# Patient Record
Sex: FEMALE | Race: OTHER | HISPANIC OR LATINO | URBAN - METROPOLITAN AREA
[De-identification: names, ages, dates, MRNs, and addresses within clinical notes are randomized per-mention and may not be internally consistent; named-entity substitution may affect disease eponyms.]

---

## 2024-05-12 ENCOUNTER — EMERGENCY (EMERGENCY)
Facility: HOSPITAL | Age: 19
LOS: 1 days | Discharge: HOME | End: 2024-05-12
Attending: STUDENT IN AN ORGANIZED HEALTH CARE EDUCATION/TRAINING PROGRAM
Payer: MEDICAID

## 2024-05-12 VITALS
HEART RATE: 114 BPM | OXYGEN SATURATION: 99 % | RESPIRATION RATE: 18 BRPM | TEMPERATURE: 100 F | HEIGHT: 62 IN | WEIGHT: 130.07 LBS | SYSTOLIC BLOOD PRESSURE: 97 MMHG | DIASTOLIC BLOOD PRESSURE: 65 MMHG

## 2024-05-12 LAB
ALBUMIN SERPL ELPH-MCNC: 3.7 G/DL — SIGNIFICANT CHANGE UP (ref 3.5–5)
ALP SERPL-CCNC: 70 U/L — SIGNIFICANT CHANGE UP (ref 40–120)
ALT FLD-CCNC: 21 U/L DA — SIGNIFICANT CHANGE UP (ref 10–60)
ANION GAP SERPL CALC-SCNC: 6 MMOL/L — SIGNIFICANT CHANGE UP (ref 5–17)
APTT BLD: 31.1 SEC — SIGNIFICANT CHANGE UP (ref 24.5–35.6)
AST SERPL-CCNC: 16 U/L — SIGNIFICANT CHANGE UP (ref 10–40)
BASOPHILS # BLD AUTO: 0.03 K/UL — SIGNIFICANT CHANGE UP (ref 0–0.2)
BASOPHILS NFR BLD AUTO: 0.2 % — SIGNIFICANT CHANGE UP (ref 0–2)
BILIRUB SERPL-MCNC: 0.5 MG/DL — SIGNIFICANT CHANGE UP (ref 0.2–1.2)
BUN SERPL-MCNC: 9 MG/DL — SIGNIFICANT CHANGE UP (ref 7–18)
CALCIUM SERPL-MCNC: 9.1 MG/DL — SIGNIFICANT CHANGE UP (ref 8.4–10.5)
CHLORIDE SERPL-SCNC: 105 MMOL/L — SIGNIFICANT CHANGE UP (ref 96–108)
CO2 SERPL-SCNC: 23 MMOL/L — SIGNIFICANT CHANGE UP (ref 22–31)
CREAT SERPL-MCNC: 0.78 MG/DL — SIGNIFICANT CHANGE UP (ref 0.5–1.3)
EGFR: 111 ML/MIN/1.73M2 — SIGNIFICANT CHANGE UP
EOSINOPHIL # BLD AUTO: 0 K/UL — SIGNIFICANT CHANGE UP (ref 0–0.5)
EOSINOPHIL NFR BLD AUTO: 0 % — SIGNIFICANT CHANGE UP (ref 0–6)
FLUAV AG NPH QL: SIGNIFICANT CHANGE UP
FLUBV AG NPH QL: SIGNIFICANT CHANGE UP
GLUCOSE SERPL-MCNC: 104 MG/DL — HIGH (ref 70–99)
HCG SERPL-ACNC: <1 MIU/ML — SIGNIFICANT CHANGE UP
HCT VFR BLD CALC: 38.8 % — SIGNIFICANT CHANGE UP (ref 34.5–45)
HGB BLD-MCNC: 13 G/DL — SIGNIFICANT CHANGE UP (ref 11.5–15.5)
IMM GRANULOCYTES NFR BLD AUTO: 0.4 % — SIGNIFICANT CHANGE UP (ref 0–0.9)
INR BLD: 1.31 RATIO — HIGH (ref 0.85–1.18)
LYMPHOCYTES # BLD AUTO: 0.7 K/UL — LOW (ref 1–3.3)
LYMPHOCYTES # BLD AUTO: 3.6 % — LOW (ref 13–44)
MCHC RBC-ENTMCNC: 30.6 PG — SIGNIFICANT CHANGE UP (ref 27–34)
MCHC RBC-ENTMCNC: 33.5 GM/DL — SIGNIFICANT CHANGE UP (ref 32–36)
MCV RBC AUTO: 91.3 FL — SIGNIFICANT CHANGE UP (ref 80–100)
MONOCYTES # BLD AUTO: 1.2 K/UL — HIGH (ref 0–0.9)
MONOCYTES NFR BLD AUTO: 6.1 % — SIGNIFICANT CHANGE UP (ref 2–14)
NEUTROPHILS # BLD AUTO: 17.56 K/UL — HIGH (ref 1.8–7.4)
NEUTROPHILS NFR BLD AUTO: 89.7 % — HIGH (ref 43–77)
NRBC # BLD: 0 /100 WBCS — SIGNIFICANT CHANGE UP (ref 0–0)
PLATELET # BLD AUTO: 203 K/UL — SIGNIFICANT CHANGE UP (ref 150–400)
POTASSIUM SERPL-MCNC: 3.4 MMOL/L — LOW (ref 3.5–5.3)
POTASSIUM SERPL-SCNC: 3.4 MMOL/L — LOW (ref 3.5–5.3)
PROT SERPL-MCNC: 7.3 G/DL — SIGNIFICANT CHANGE UP (ref 6–8.3)
PROTHROM AB SERPL-ACNC: 14.8 SEC — HIGH (ref 9.5–13)
RBC # BLD: 4.25 M/UL — SIGNIFICANT CHANGE UP (ref 3.8–5.2)
RBC # FLD: 13.3 % — SIGNIFICANT CHANGE UP (ref 10.3–14.5)
SARS-COV-2 RNA SPEC QL NAA+PROBE: SIGNIFICANT CHANGE UP
SODIUM SERPL-SCNC: 134 MMOL/L — LOW (ref 135–145)
WBC # BLD: 19.57 K/UL — HIGH (ref 3.8–10.5)
WBC # FLD AUTO: 19.57 K/UL — HIGH (ref 3.8–10.5)

## 2024-05-12 PROCEDURE — 80053 COMPREHEN METABOLIC PANEL: CPT

## 2024-05-12 PROCEDURE — 85610 PROTHROMBIN TIME: CPT

## 2024-05-12 PROCEDURE — 99285 EMERGENCY DEPT VISIT HI MDM: CPT | Mod: 25

## 2024-05-12 PROCEDURE — 96374 THER/PROPH/DIAG INJ IV PUSH: CPT

## 2024-05-12 PROCEDURE — 96375 TX/PRO/DX INJ NEW DRUG ADDON: CPT

## 2024-05-12 PROCEDURE — 85025 COMPLETE CBC W/AUTO DIFF WBC: CPT

## 2024-05-12 PROCEDURE — 71046 X-RAY EXAM CHEST 2 VIEWS: CPT

## 2024-05-12 PROCEDURE — 84702 CHORIONIC GONADOTROPIN TEST: CPT

## 2024-05-12 PROCEDURE — 71046 X-RAY EXAM CHEST 2 VIEWS: CPT | Mod: 26

## 2024-05-12 PROCEDURE — 81001 URINALYSIS AUTO W/SCOPE: CPT

## 2024-05-12 PROCEDURE — 87040 BLOOD CULTURE FOR BACTERIA: CPT

## 2024-05-12 PROCEDURE — 87637 SARSCOV2&INF A&B&RSV AMP PRB: CPT

## 2024-05-12 PROCEDURE — 93005 ELECTROCARDIOGRAM TRACING: CPT

## 2024-05-12 PROCEDURE — 36415 COLL VENOUS BLD VENIPUNCTURE: CPT

## 2024-05-12 PROCEDURE — 0225U NFCT DS DNA&RNA 21 SARSCOV2: CPT

## 2024-05-12 PROCEDURE — 99285 EMERGENCY DEPT VISIT HI MDM: CPT

## 2024-05-12 PROCEDURE — 85730 THROMBOPLASTIN TIME PARTIAL: CPT

## 2024-05-12 RX ORDER — SODIUM CHLORIDE 9 MG/ML
2000 INJECTION INTRAMUSCULAR; INTRAVENOUS; SUBCUTANEOUS ONCE
Refills: 0 | Status: COMPLETED | OUTPATIENT
Start: 2024-05-12 | End: 2024-05-12

## 2024-05-12 RX ORDER — ACETAMINOPHEN 500 MG
1000 TABLET ORAL ONCE
Refills: 0 | Status: COMPLETED | OUTPATIENT
Start: 2024-05-12 | End: 2024-05-12

## 2024-05-12 RX ORDER — KETOROLAC TROMETHAMINE 30 MG/ML
15 SYRINGE (ML) INJECTION ONCE
Refills: 0 | Status: DISCONTINUED | OUTPATIENT
Start: 2024-05-12 | End: 2024-05-12

## 2024-05-12 RX ADMIN — SODIUM CHLORIDE 2000 MILLILITER(S): 9 INJECTION INTRAMUSCULAR; INTRAVENOUS; SUBCUTANEOUS at 23:19

## 2024-05-12 RX ADMIN — Medication 400 MILLIGRAM(S): at 23:18

## 2024-05-12 RX ADMIN — Medication 15 MILLIGRAM(S): at 23:18

## 2024-05-12 NOTE — ED PROVIDER NOTE - CLINICAL SUMMARY MEDICAL DECISION MAKING FREE TEXT BOX
patient presenting with fever body ache clinically likely viral no nuchal rigidity no abdominal pain or pulmonary symptoms will obtain lab fluid hydrate ED observation symptomatic treatment and reassess

## 2024-05-12 NOTE — ED PROVIDER NOTE - OBJECTIVE STATEMENT
19-year-old presenting for fever body aches sore throat and generalized back pain for 2 days denies nausea vomiting cough dysuria diarrhea abdominal pain travel sick contact

## 2024-05-12 NOTE — ED PROVIDER NOTE - PATIENT PORTAL LINK FT
You can access the FollowMyHealth Patient Portal offered by [facility] by registering at the following website: [email or web address]. By joining [facility] FollowMyHealth portal, you will also be able to view your health information using other applications (apps) compatible with our system.

## 2024-05-12 NOTE — ED PROVIDER NOTE - NSFOLLOWUPINSTRUCTIONS_ED_ALL_ED_FT
Enfermedades virales en los adultos  Viral Illness, Adult  Los virus son microbios diminutos que entran en el organismo de mica persona y causan enfermedades. Hay muchos tipos diferentes de virus. Y causan muchos tipos de enfermedades. Las enfermedades virales pueden ser leves o graves. Pueden afectar diferentes partes del cuerpo.    Entre las afecciones a corto plazo causadas por virus, se incluyen los resfríos, la gripe y los virus estomacales. Entre las afecciones a rizwan plazo causadas por un virus, se incluyen el herpes, la culebrilla y la infección por el virus de inmunodeficiencia humana (VIH). Se logan identificado unos pocos virus asociados con determinados tipos de cáncer.    ¿Cuáles son las causas?  Muchos tipos de virus pueden causar enfermedades. Los virus ingresan en las células del organismo, se multiplican y provocan que las células infectadas funcionen de manera diferente o mueran. Cuando estas células mueren, liberan más virus. Cuando esto ocurre, aparecen los síntomas de la enfermedad, y el virus se disemina a otras células. Si el virus domina el funcionamiento de la célula, puede hacer que esta se divida y prolifere de manera descontrolada. [place] ocurre cuando un virus causa cáncer.    Los diferentes virus ingresan al organismo de distintas formas. Puede contraer un virus de la siguiente manera:  Al ingerir alimentos o beber agua que logan entrado en contacto con el virus.  Al inhalar gotitas que mica persona infectada liberó en el aire al toser o estornudar.  Al tocar mica superficie que tenga el virus y luego llevarse la mano a la boca, la nariz o los ojos.  Al ser kan por un insecto o mordido por un animal que son portadores del virus.  Al tener contacto sexual con mica persona infectada por el virus.  Al tener contacto con faiza o líquidos que contienen el virus, ya sea a través de un rohini abierto o leia mica transfusión.  Si el virus ingresa al organismo, el sistema del cuerpo que combate las enfermedades (sistema inmunitario) intentará combatirlo. Puede correr un riesgo más alto de tener mica enfermedad viral si tiene el sistema inmunitario debilitado.    ¿Cuáles son los signos o síntomas?  Los síntomas dependen del tipo de virus y de la ubicación de las células en las que ingresa. Entre los síntomas se pueden incluir los siguientes:  Con los virus del resfrío y de la gripe:  Fiebre.  Dolor de nba.  Dolor de garganta.  Emily musculares.  Nariz tapada (congestión nasal).  Tos.  Con los virus estomacales (gastrointestinales):  Fiebre.  Dolor en el abdomen.  Náuseas o vómitos.  Diarrea.  Con los virus hepáticos (hepatitis):  Pérdida del apetito.  Cansancio.  La piel o las partes blanca de los ojos se ponen hilario (ictericia).  Con los virus del cerebro y la médula alvarenga:  Fiebre.  Dolor de nba.  Rigidez en el warren.  Náuseas y vómitos.  Confusión o somnolencia.  Con los virus de la piel:  Verrugas.  Picazón.  Erupción cutánea.  Con los virus de transmisión sexual:  Secreción.  Hinchazón.  Enrojecimiento.  Erupción cutánea.  ¿Cómo se diagnostica?  Esta afección se puede diagnosticar en función de mica o más de las siguientes evaluaciones:  Los síntomas y antecedentes médicos.  Un examen físico.  Pruebas, ruth, por ejemplo:  Análisis de faiza.  Análisis de mica muestra de mucosidad de los pulmones (muestra de esputo).  Análisis de mica muestra de materia fecal (heces).  Análisis de un hisopado de líquidos corporales o mica llaga en la piel (lesión).  ¿Cómo se trata?  Los virus pueden ser difíciles de tratar porque se hospedan en las células. Los antibióticos no tratan los virus porque estos medicamentos no llegan al interior de las células. El tratamiento de mica enfermedad viral puede incluir lo siguiente:  Descansar y beber mucho líquido.  Medicamentos para tratar los síntomas. Estos pueden incluir medicamentos de venta umair para el dolor y la fiebre, medicamentos para la tos o la congestión y medicamentos para la diarrea.  Medicamentos antivirales. Estos medicamentos están disponibles únicamente para ciertos tipos de virus.  Algunas enfermedades virales pueden evitarse con vacunas. Un ejemplo frecuente es la vacuna antigripal.    Siga estas indicaciones en lakhani casa:  Medicamentos    Use los medicamentos de venta umair y los recetados solamente ruth se lo haya indicado el médico.  Si le recetaron un medicamento antiviral, tómelo ruth se lo haya indicado el médico. No deje de kesha el antiviral aunque comience a sentirse mejor.  Sepa cuándo los antibióticos son necesarios y cuándo no. Los antibióticos no combaten a los virus. Si tiene mica infección viral y el médico melissa que también tiene mica infección bacteriana, o que está en riesgo de contraerla, nadeem vez le recete un antibiótico.  No solicite mica receta para antibióticos si le logan diagnosticado mica enfermedad viral. Los antibióticos no harán que se cure más rápidamente.  Kesha antibióticos cuando no son necesarios puede derivar en resistencia a los antibióticos. Cuando esto ocurre, el medicamento pierde lakhani eficacia contra las bacterias que normalmente combate.  Indicaciones generales    Saray suficiente líquido para mantener el pis (la orina) de color amarillo pálido.  Descanse todo lo que pueda.  Retome vicky actividades normales ruth se lo haya indicado el médico. Pregúntele al médico qué actividades son seguras para usted.  ¿Cómo se previene?  A person washing hands with soap and water.  Para reducir el riesgo de contraer otra enfermedad viral:  Lávese las mackenzie frecuentemente con agua y jabón leia al menos 20 segundos. Use desinfectante para mackenzie si no dispone de agua y jabón.  Evite tocarse la nariz, los ojos y la boca, sobre todo si no se lavó las mackenzie recientemente.  Si un miembro de la octavio tiene mica infección viral, limpie todas las superficies de la casa que puedan joyce estado en contacto con el virus. Use "[employer] y jabón. También puede usar mica solución de preparación comercial que contenga lejía.  Manténgase lejos de las personas enfermas con síntomas de mica infección viral.  No comparta objetos tales ruth cepillos de dientes y botellas de agua con otras personas.  Mantenga las vacunas al día. [place] incluye recibir la vacuna antigripal todos los años.  Siga mica dieta saludable y descanse lo suficiente.  Comuníquese con un médico si:  Tiene síntomas de mica enfermedad viral que no desaparecen.  Los síntomas regresan después de joyce desaparecido.  Vicky síntomas empeoran.  Solicite ayuda de inmediato si:  Tiene dificultad para respirar.  Siente un dolor de nba intenso o rigidez en el warren.  Tiene vómitos o dolor abdominal intensos.  Estos síntomas pueden indicar mica emergencia. Solicite ayuda de inmediato. Llame al 911.  No espere a lexii si los síntomas desaparecen.  No conduzca por vicky propios medios hasta el hospital.  Esta información no tiene ruth fin reemplazar el consejo del médico. Asegúrese de hacerle al médico cualquier pregunta que tenga.

## 2024-05-12 NOTE — ED PROVIDER NOTE - PROGRESS NOTE DETAILS
Patient clinically well no signs of distress fever resolved heart rate improved blood pressure systolic 90s Y small stature clinically does not appear to be hypotensive while superbly perfused no nuchal rigidity is just meningitis no abdominal pain to suggest surgical abdomen lungs clear well-appearing labs within normal and cephalo to white count but white count elevated likely in the setting of viral illness no airway compromise no neuro vascular compromise patient clinically stable endorses feeling improved culture sent patient states she feels well and wished to go home given return precaution and for symptomatic treatment return precaution instructed hemodynamically stable neurovascular intact on discharge

## 2024-05-12 NOTE — ED ADULT NURSE NOTE - OBJECTIVE STATEMENT
pt reports that she has  fever for 2 days and generalized body pain, denies dizziness, and all other symptoms

## 2024-05-13 VITALS
RESPIRATION RATE: 18 BRPM | SYSTOLIC BLOOD PRESSURE: 97 MMHG | TEMPERATURE: 99 F | OXYGEN SATURATION: 100 % | HEART RATE: 103 BPM | DIASTOLIC BLOOD PRESSURE: 58 MMHG

## 2024-05-13 LAB
APPEARANCE UR: CLEAR — SIGNIFICANT CHANGE UP
BILIRUB UR-MCNC: NEGATIVE — SIGNIFICANT CHANGE UP
COLOR SPEC: YELLOW — SIGNIFICANT CHANGE UP
DIFF PNL FLD: NEGATIVE — SIGNIFICANT CHANGE UP
GLUCOSE UR QL: NEGATIVE MG/DL — SIGNIFICANT CHANGE UP
KETONES UR-MCNC: 80 MG/DL
LEUKOCYTE ESTERASE UR-ACNC: NEGATIVE — SIGNIFICANT CHANGE UP
NITRITE UR-MCNC: NEGATIVE — SIGNIFICANT CHANGE UP
PH UR: 7.5 — SIGNIFICANT CHANGE UP (ref 5–8)
PROT UR-MCNC: ABNORMAL MG/DL
SP GR SPEC: 1.02 — SIGNIFICANT CHANGE UP (ref 1–1.03)
UROBILINOGEN FLD QL: 2 MG/DL (ref 0.2–1)

## 2024-05-13 RX ORDER — SODIUM CHLORIDE 9 MG/ML
1000 INJECTION INTRAMUSCULAR; INTRAVENOUS; SUBCUTANEOUS ONCE
Refills: 0 | Status: DISCONTINUED | OUTPATIENT
Start: 2024-05-13 | End: 2024-05-16

## 2024-05-13 RX ORDER — IBUPROFEN 200 MG
1 TABLET ORAL
Qty: 20 | Refills: 0
Start: 2024-05-13

## 2024-05-13 RX ORDER — ACETAMINOPHEN 500 MG
2 TABLET ORAL
Qty: 40 | Refills: 0
Start: 2024-05-13

## 2024-05-18 LAB
CULTURE RESULTS: SIGNIFICANT CHANGE UP
CULTURE RESULTS: SIGNIFICANT CHANGE UP
SPECIMEN SOURCE: SIGNIFICANT CHANGE UP
SPECIMEN SOURCE: SIGNIFICANT CHANGE UP

## 2024-07-10 ENCOUNTER — EMERGENCY (EMERGENCY)
Facility: HOSPITAL | Age: 19
LOS: 1 days | Discharge: HOME | End: 2024-07-10
Attending: STUDENT IN AN ORGANIZED HEALTH CARE EDUCATION/TRAINING PROGRAM
Payer: MEDICAID

## 2024-07-10 VITALS
OXYGEN SATURATION: 100 % | SYSTOLIC BLOOD PRESSURE: 101 MMHG | HEIGHT: 62 IN | WEIGHT: 132.28 LBS | DIASTOLIC BLOOD PRESSURE: 68 MMHG | HEART RATE: 70 BPM | RESPIRATION RATE: 20 BRPM | TEMPERATURE: 98 F

## 2024-07-10 PROCEDURE — 99284 EMERGENCY DEPT VISIT MOD MDM: CPT

## 2024-07-10 RX ORDER — ACETAMINOPHEN 500 MG/5ML
975 LIQUID (ML) ORAL ONCE
Refills: 0 | Status: COMPLETED | OUTPATIENT
Start: 2024-07-10 | End: 2024-07-10

## 2024-07-10 RX ADMIN — Medication 975 MILLIGRAM(S): at 23:49

## 2024-07-11 VITALS
DIASTOLIC BLOOD PRESSURE: 64 MMHG | TEMPERATURE: 98 F | RESPIRATION RATE: 18 BRPM | OXYGEN SATURATION: 100 % | SYSTOLIC BLOOD PRESSURE: 98 MMHG | HEART RATE: 72 BPM

## 2024-07-11 LAB
ALBUMIN SERPL ELPH-MCNC: 3.5 G/DL — SIGNIFICANT CHANGE UP (ref 3.5–5)
ALP SERPL-CCNC: 67 U/L — SIGNIFICANT CHANGE UP (ref 40–120)
ALT FLD-CCNC: 33 U/L DA — SIGNIFICANT CHANGE UP (ref 10–60)
ANION GAP SERPL CALC-SCNC: 8 MMOL/L — SIGNIFICANT CHANGE UP (ref 5–17)
APPEARANCE UR: CLEAR — SIGNIFICANT CHANGE UP
AST SERPL-CCNC: 24 U/L — SIGNIFICANT CHANGE UP (ref 10–40)
BASOPHILS # BLD AUTO: 0.04 K/UL — SIGNIFICANT CHANGE UP (ref 0–0.2)
BASOPHILS NFR BLD AUTO: 0.2 % — SIGNIFICANT CHANGE UP (ref 0–2)
BILIRUB SERPL-MCNC: 0.3 MG/DL — SIGNIFICANT CHANGE UP (ref 0.2–1.2)
BILIRUB UR-MCNC: NEGATIVE — SIGNIFICANT CHANGE UP
BLD GP AB SCN SERPL QL: SIGNIFICANT CHANGE UP
BUN SERPL-MCNC: 8 MG/DL — SIGNIFICANT CHANGE UP (ref 7–18)
CALCIUM SERPL-MCNC: 8.6 MG/DL — SIGNIFICANT CHANGE UP (ref 8.4–10.5)
CHLORIDE SERPL-SCNC: 107 MMOL/L — SIGNIFICANT CHANGE UP (ref 96–108)
CO2 SERPL-SCNC: 21 MMOL/L — LOW (ref 22–31)
COLOR SPEC: YELLOW — SIGNIFICANT CHANGE UP
CREAT SERPL-MCNC: 0.61 MG/DL — SIGNIFICANT CHANGE UP (ref 0.5–1.3)
DIFF PNL FLD: NEGATIVE — SIGNIFICANT CHANGE UP
EGFR: 130 ML/MIN/1.73M2 — SIGNIFICANT CHANGE UP
EGFR: 130 ML/MIN/1.73M2 — SIGNIFICANT CHANGE UP
EOSINOPHIL # BLD AUTO: 0.07 K/UL — SIGNIFICANT CHANGE UP (ref 0–0.5)
EOSINOPHIL NFR BLD AUTO: 0.3 % — SIGNIFICANT CHANGE UP (ref 0–6)
GLUCOSE SERPL-MCNC: 78 MG/DL — SIGNIFICANT CHANGE UP (ref 70–99)
GLUCOSE UR QL: NEGATIVE MG/DL — SIGNIFICANT CHANGE UP
HCG SERPL-ACNC: HIGH MIU/ML
HCT VFR BLD CALC: 38.7 % — SIGNIFICANT CHANGE UP (ref 34.5–45)
HGB BLD-MCNC: 13.2 G/DL — SIGNIFICANT CHANGE UP (ref 11.5–15.5)
IMM GRANULOCYTES NFR BLD AUTO: 0.6 % — SIGNIFICANT CHANGE UP (ref 0–0.9)
KETONES UR-MCNC: ABNORMAL MG/DL
LEUKOCYTE ESTERASE UR-ACNC: NEGATIVE — SIGNIFICANT CHANGE UP
LIDOCAIN IGE QN: 35 U/L — SIGNIFICANT CHANGE UP (ref 13–75)
LYMPHOCYTES # BLD AUTO: 11.5 % — LOW (ref 13–44)
LYMPHOCYTES # BLD AUTO: 2.44 K/UL — SIGNIFICANT CHANGE UP (ref 1–3.3)
MCHC RBC-ENTMCNC: 31.2 PG — SIGNIFICANT CHANGE UP (ref 27–34)
MCHC RBC-ENTMCNC: 34.1 GM/DL — SIGNIFICANT CHANGE UP (ref 32–36)
MCV RBC AUTO: 91.5 FL — SIGNIFICANT CHANGE UP (ref 80–100)
MONOCYTES # BLD AUTO: 1.06 K/UL — HIGH (ref 0–0.9)
MONOCYTES NFR BLD AUTO: 5 % — SIGNIFICANT CHANGE UP (ref 2–14)
NEUTROPHILS # BLD AUTO: 17.55 K/UL — HIGH (ref 1.8–7.4)
NEUTROPHILS NFR BLD AUTO: 82.4 % — HIGH (ref 43–77)
NITRITE UR-MCNC: NEGATIVE — SIGNIFICANT CHANGE UP
NRBC # BLD: 0 /100 WBCS — SIGNIFICANT CHANGE UP (ref 0–0)
NRBC BLD-RTO: 0 /100 WBCS — SIGNIFICANT CHANGE UP (ref 0–0)
PH UR: 7 — SIGNIFICANT CHANGE UP (ref 5–8)
PLATELET # BLD AUTO: 231 K/UL — SIGNIFICANT CHANGE UP (ref 150–400)
POTASSIUM SERPL-MCNC: 3.4 MMOL/L — LOW (ref 3.5–5.3)
POTASSIUM SERPL-SCNC: 3.4 MMOL/L — LOW (ref 3.5–5.3)
PROT SERPL-MCNC: 7 G/DL — SIGNIFICANT CHANGE UP (ref 6–8.3)
PROT UR-MCNC: NEGATIVE MG/DL — SIGNIFICANT CHANGE UP
RBC # BLD: 4.23 M/UL — SIGNIFICANT CHANGE UP (ref 3.8–5.2)
RBC # FLD: 13.2 % — SIGNIFICANT CHANGE UP (ref 10.3–14.5)
SODIUM SERPL-SCNC: 136 MMOL/L — SIGNIFICANT CHANGE UP (ref 135–145)
SP GR SPEC: 1.01 — SIGNIFICANT CHANGE UP (ref 1–1.03)
UROBILINOGEN FLD QL: 0.2 MG/DL — SIGNIFICANT CHANGE UP (ref 0.2–1)
WBC # BLD: 21.28 K/UL — HIGH (ref 3.8–10.5)
WBC # FLD AUTO: 21.28 K/UL — HIGH (ref 3.8–10.5)

## 2024-07-11 PROCEDURE — 76801 OB US < 14 WKS SINGLE FETUS: CPT

## 2024-07-11 PROCEDURE — 76801 OB US < 14 WKS SINGLE FETUS: CPT | Mod: 26

## 2024-07-11 PROCEDURE — 36415 COLL VENOUS BLD VENIPUNCTURE: CPT

## 2024-07-11 PROCEDURE — 76817 TRANSVAGINAL US OBSTETRIC: CPT | Mod: 26

## 2024-07-11 PROCEDURE — 85025 COMPLETE CBC W/AUTO DIFF WBC: CPT

## 2024-07-11 PROCEDURE — 83690 ASSAY OF LIPASE: CPT

## 2024-07-11 PROCEDURE — 84702 CHORIONIC GONADOTROPIN TEST: CPT

## 2024-07-11 PROCEDURE — 76817 TRANSVAGINAL US OBSTETRIC: CPT

## 2024-07-11 PROCEDURE — 86850 RBC ANTIBODY SCREEN: CPT

## 2024-07-11 PROCEDURE — 86900 BLOOD TYPING SEROLOGIC ABO: CPT

## 2024-07-11 PROCEDURE — 80053 COMPREHEN METABOLIC PANEL: CPT

## 2024-07-11 PROCEDURE — 81003 URINALYSIS AUTO W/O SCOPE: CPT

## 2024-07-11 PROCEDURE — 99284 EMERGENCY DEPT VISIT MOD MDM: CPT | Mod: 25

## 2024-07-11 PROCEDURE — 86901 BLOOD TYPING SEROLOGIC RH(D): CPT

## 2025-01-02 ENCOUNTER — OUTPATIENT (OUTPATIENT)
Dept: OUTPATIENT SERVICES | Facility: HOSPITAL | Age: 20
LOS: 1 days | End: 2025-01-02
Payer: MEDICAID

## 2025-01-02 DIAGNOSIS — O26.899 OTHER SPECIFIED PREGNANCY RELATED CONDITIONS, UNSPECIFIED TRIMESTER: ICD-10-CM

## 2025-01-02 PROCEDURE — 99283 EMERGENCY DEPT VISIT LOW MDM: CPT

## 2025-01-02 PROCEDURE — 99213 OFFICE O/P EST LOW 20 MIN: CPT | Mod: 25

## 2025-01-02 PROCEDURE — G0463: CPT

## 2025-01-02 PROCEDURE — 59025 FETAL NON-STRESS TEST: CPT | Mod: 26

## 2025-01-02 PROCEDURE — 59025 FETAL NON-STRESS TEST: CPT

## 2025-01-03 VITALS
TEMPERATURE: 99 F | OXYGEN SATURATION: 99 % | SYSTOLIC BLOOD PRESSURE: 116 MMHG | DIASTOLIC BLOOD PRESSURE: 70 MMHG | RESPIRATION RATE: 18 BRPM | HEART RATE: 103 BPM

## 2025-01-03 RX ORDER — METRONIDAZOLE 7.5 MG/G
1 GEL TOPICAL
Qty: 1 | Refills: 0
Start: 2025-01-03 | End: 2025-01-07

## 2025-01-03 NOTE — OB PROVIDER TRIAGE NOTE - ADDITIONAL INSTRUCTIONS
Continue prenatal vitamins   If water breaks, you develop contractions, or notice vaginal bleeding return to delivery room  Check the baby movements with daily fetal kick count  Modified activities: walk as tolerated to prevent clot formation  Increase hydration, drink 2-3liters of water per day; avoid caffeine beverages which can cause palpitations and dehydration  follow up as scheduled outpatient   metrogel sent to pharmacy

## 2025-01-03 NOTE — OB PROVIDER TRIAGE NOTE - NSOBPROVIDERNOTE_OBGYN_ALL_OB_FT
18 yo  @ 32 weeks 6 days (LMP: 24, KIKA: 25) presenting to L&D complaining of swelling of her hands and feet for the past week  possible carpal tunnel  patient normotensive   no signs and symptoms of preeclampsia   suspected bacterial vaginosis     - NST reviewd, category I, reactive   - discussed with helena Steele attending   - follow up as scheduled outpatient in 2 weeks  - metronidazole gel sent to patients pharmacy

## 2025-01-03 NOTE — OB RN TRIAGE NOTE - NSNURSINGINSTR_OBGYN_ALL_OB_FT
Patient discharged to home and verbalized understanding of discharge instructions. Patient educated about fetal kick counts and pre-term labor precautions. Reinforced teachings with literature. Safety and comfort maintained.

## 2025-01-03 NOTE — OB PROVIDER TRIAGE NOTE - NSHPPHYSICALEXAM_GEN_ALL_CORE
Vital Signs Last 24 Hrs  T(C): 37.1 (03 Jan 2025 00:12), Max: 37.1 (03 Jan 2025 00:12)  T(F): 98.8 (03 Jan 2025 00:12), Max: 98.8 (03 Jan 2025 00:12)  HR: 103 (03 Jan 2025 00:12) (100 - 103)  BP: 116/70 (03 Jan 2025 00:12) (105/62 - 116/70)  RR: 18 (03 Jan 2025 00:12) (16 - 18)  SpO2: 99% (03 Jan 2025 00:12) (99% - 100%)  Parameters below as of 03 Jan 2025 00:12  Patient On (Oxygen Delivery Method): room air    General: patient is resting comfortably in bed, NAD, A&Ox3  Cardiac: RRR  Pulmonary: clear to auscultation throughout   Abdominal: gravid uterus, soft nontender, no guarding or rebound tenderness  SVE: no blood or fluid   noted thick white discharge at vaginal introitus   closed/long/posterior   Extremities: no edema, nontender     FHT: baseline: 135, moderate variability, + accels, no decels  uterine irritability vs movement

## 2025-01-03 NOTE — OB RN TRIAGE NOTE - CHIEF COMPLAINT QUOTE
"both hands and legs hurt and I have swelling and tingling x 1 week, I came in because I am unable to hold my phone now. I also think I have an infection in my vaginal part because it hurts and I have vaginal itching x 3 days, I also feel a lump or a cyst by my clotoris which was small and is growing less than a week, I also have the flu have mucus in the nose x 2 days"

## 2025-01-03 NOTE — OB PROVIDER TRIAGE NOTE - HISTORY OF PRESENT ILLNESS
[id number] utilized   20 yo  @ 32 weeks 6 days (LMP: 24, KIKA: 25) presenting to L&D complaining of swelling of her hands and feet for the past week. Patient reports her hands and feet feel tingly and swollen. Patient also reports vaginal itching for the past 3 days and whitish discharge. Reports + fetal movement. Denies vaginal bleeding, loss of fluid, contractions. Denies headache, visual disturbances, epigastric pain, chest pain, shortness of breath, N/V/D/C, fever, chills, abdominal pain    PNC: follows with [facility], uncomplicated   OBHx: primigravid   GynHx: denies fibroids, cysts, STD, abnormal PAP  PMHx: denies   Meds: PNV   PSHx: denies   Allergies: no known allergies   Social: denies tobacco/etoh/drug use   Psych: denies anxiety, depression, PTSD

## 2025-01-08 DIAGNOSIS — O23.593 INFECTION OF OTHER PART OF GENITAL TRACT IN PREGNANCY, THIRD TRIMESTER: ICD-10-CM

## 2025-01-08 DIAGNOSIS — N76.0 ACUTE VAGINITIS: ICD-10-CM

## 2025-01-08 DIAGNOSIS — O99.891 OTHER SPECIFIED DISEASES AND CONDITIONS COMPLICATING PREGNANCY: ICD-10-CM

## 2025-01-08 DIAGNOSIS — M79.89 OTHER SPECIFIED SOFT TISSUE DISORDERS: ICD-10-CM

## 2025-01-08 DIAGNOSIS — Z3A.32 32 WEEKS GESTATION OF PREGNANCY: ICD-10-CM

## 2025-02-14 ENCOUNTER — APPOINTMENT (OUTPATIENT)
Dept: ANTEPARTUM | Facility: CLINIC | Age: 20
End: 2025-02-14

## 2025-02-14 ENCOUNTER — OUTPATIENT (OUTPATIENT)
Dept: OUTPATIENT SERVICES | Facility: HOSPITAL | Age: 20
LOS: 1 days | Discharge: HOME | End: 2025-02-14
Payer: MEDICAID

## 2025-02-14 VITALS
DIASTOLIC BLOOD PRESSURE: 55 MMHG | RESPIRATION RATE: 15 BRPM | HEART RATE: 88 BPM | TEMPERATURE: 99 F | SYSTOLIC BLOOD PRESSURE: 103 MMHG

## 2025-02-14 VITALS — DIASTOLIC BLOOD PRESSURE: 56 MMHG | SYSTOLIC BLOOD PRESSURE: 109 MMHG | HEART RATE: 84 BPM

## 2025-02-14 DIAGNOSIS — O26.899 OTHER SPECIFIED PREGNANCY RELATED CONDITIONS, UNSPECIFIED TRIMESTER: ICD-10-CM

## 2025-02-14 LAB
ALBUMIN SERPL ELPH-MCNC: 3.4 G/DL — SIGNIFICANT CHANGE UP (ref 3.3–5)
ALP SERPL-CCNC: 245 U/L — HIGH (ref 40–120)
ALT FLD-CCNC: 9 U/L — SIGNIFICANT CHANGE UP (ref 4–33)
ANION GAP SERPL CALC-SCNC: 13 MMOL/L — SIGNIFICANT CHANGE UP (ref 7–14)
APPEARANCE UR: CLEAR — SIGNIFICANT CHANGE UP
AST SERPL-CCNC: 17 U/L — SIGNIFICANT CHANGE UP (ref 4–32)
BACTERIA # UR AUTO: NEGATIVE /HPF — SIGNIFICANT CHANGE UP
BILIRUB SERPL-MCNC: 0.5 MG/DL — SIGNIFICANT CHANGE UP (ref 0.2–1.2)
BILIRUB UR-MCNC: NEGATIVE — SIGNIFICANT CHANGE UP
BUN SERPL-MCNC: 11 MG/DL — SIGNIFICANT CHANGE UP (ref 7–23)
CALCIUM SERPL-MCNC: 9.3 MG/DL — SIGNIFICANT CHANGE UP (ref 8.4–10.5)
CAST: 0 /LPF — SIGNIFICANT CHANGE UP (ref 0–4)
CHLORIDE SERPL-SCNC: 104 MMOL/L — SIGNIFICANT CHANGE UP (ref 98–107)
CO2 SERPL-SCNC: 17 MMOL/L — LOW (ref 22–31)
COLOR SPEC: YELLOW — SIGNIFICANT CHANGE UP
CREAT SERPL-MCNC: 0.58 MG/DL — SIGNIFICANT CHANGE UP (ref 0.5–1.3)
DIFF PNL FLD: NEGATIVE — SIGNIFICANT CHANGE UP
EGFR: 131 ML/MIN/1.73M2 — SIGNIFICANT CHANGE UP
GLUCOSE SERPL-MCNC: 70 MG/DL — SIGNIFICANT CHANGE UP (ref 70–99)
GLUCOSE UR QL: NEGATIVE MG/DL — SIGNIFICANT CHANGE UP
HCT VFR BLD CALC: 37.3 % — SIGNIFICANT CHANGE UP (ref 34.5–45)
HGB BLD-MCNC: 11.8 G/DL — SIGNIFICANT CHANGE UP (ref 11.5–15.5)
KETONES UR-MCNC: NEGATIVE MG/DL — SIGNIFICANT CHANGE UP
LEUKOCYTE ESTERASE UR-ACNC: ABNORMAL
MCHC RBC-ENTMCNC: 26.6 PG — LOW (ref 27–34)
MCHC RBC-ENTMCNC: 31.6 G/DL — LOW (ref 32–36)
MCV RBC AUTO: 84 FL — SIGNIFICANT CHANGE UP (ref 80–100)
NITRITE UR-MCNC: NEGATIVE — SIGNIFICANT CHANGE UP
NRBC # BLD AUTO: 0 K/UL — SIGNIFICANT CHANGE UP (ref 0–0)
NRBC # FLD: 0 K/UL — SIGNIFICANT CHANGE UP (ref 0–0)
NRBC BLD AUTO-RTO: 0 /100 WBCS — SIGNIFICANT CHANGE UP (ref 0–0)
PH UR: 7 — SIGNIFICANT CHANGE UP (ref 5–8)
PLATELET # BLD AUTO: 188 K/UL — SIGNIFICANT CHANGE UP (ref 150–400)
POTASSIUM SERPL-MCNC: 4 MMOL/L — SIGNIFICANT CHANGE UP (ref 3.5–5.3)
POTASSIUM SERPL-SCNC: 4 MMOL/L — SIGNIFICANT CHANGE UP (ref 3.5–5.3)
PROT SERPL-MCNC: 6.9 G/DL — SIGNIFICANT CHANGE UP (ref 6–8.3)
PROT UR-MCNC: NEGATIVE MG/DL — SIGNIFICANT CHANGE UP
RBC # BLD: 4.44 M/UL — SIGNIFICANT CHANGE UP (ref 3.8–5.2)
RBC # FLD: 16 % — HIGH (ref 10.3–14.5)
RBC CASTS # UR COMP ASSIST: 1 /HPF — SIGNIFICANT CHANGE UP (ref 0–4)
REVIEW: SIGNIFICANT CHANGE UP
SODIUM SERPL-SCNC: 134 MMOL/L — LOW (ref 135–145)
SP GR SPEC: 1.02 — SIGNIFICANT CHANGE UP (ref 1–1.03)
SQUAMOUS # UR AUTO: 5 /HPF — SIGNIFICANT CHANGE UP (ref 0–5)
UROBILINOGEN FLD QL: 1 MG/DL — SIGNIFICANT CHANGE UP (ref 0.2–1)
WBC # BLD: 14.57 K/UL — HIGH (ref 3.8–10.5)
WBC # FLD AUTO: 14.57 K/UL — HIGH (ref 3.8–10.5)
WBC UR QL: 2 /HPF — SIGNIFICANT CHANGE UP (ref 0–5)

## 2025-02-14 PROCEDURE — 99221 1ST HOSP IP/OBS SF/LOW 40: CPT | Mod: 25

## 2025-02-14 PROCEDURE — 59025 FETAL NON-STRESS TEST: CPT | Mod: 26

## 2025-02-14 RX ORDER — ACETAMINOPHEN 160 MG/5ML
975 SUSPENSION ORAL ONCE
Refills: 0 | Status: COMPLETED | OUTPATIENT
Start: 2025-02-14 | End: 2025-02-14

## 2025-02-14 RX ORDER — PNV WITH CALCIUM NO.11/IRON/FA 65 MG-1 MG
1 TABLET ORAL
Refills: 0 | DISCHARGE

## 2025-02-14 RX ADMIN — ACETAMINOPHEN 975 MILLIGRAM(S): 160 SUSPENSION ORAL at 23:02

## 2025-02-14 RX ADMIN — ACETAMINOPHEN 975 MILLIGRAM(S): 160 SUSPENSION ORAL at 23:38

## 2025-02-14 NOTE — OB PROVIDER TRIAGE NOTE - ADDITIONAL INSTRUCTIONS
Please keep all appointments.  Return to the hospital if you have vaginal bleeding, Leakage of fluid, contractions, or decreased fetal movement.

## 2025-02-14 NOTE — OB RN TRIAGE NOTE - SUICIDE SCREENING QUESTION 3
JT,  I am going to get her lipids under control.  Afterwards, I will see her PRN.  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  If she develops new cardiac issues, please feel free to send her back for re-evaluation.  Dago
No

## 2025-02-14 NOTE — OB PROVIDER TRIAGE NOTE - NSOBPROVIDERNOTE_OBGYN_ALL_OB_FT
19 yo  @ 38.6wga p/w suprapubic pain. Rh(-) and GBS +    1.  FHT reactive, BPP 8/8 DONNA 12.67cm  2.  UA and labs sent will follow up       Daksha Henderson MD 21 yo  @ 38.6wga p/w pelvic pressure. Rh(-) and GBS +    1.  FHT reactive, BPP 8/8 DONNA 12.67cm  2.  pelvic pressure - ruled out for labor cervix is closed.  Pressure noted where fetal head is.  Pain completely resolved after Tylenol.  UA only small leuks.   3.  VB/Labor/SROM/dec fM precautions given   4.  Pt cleared for discharge home, pt to follow up at next scheduled appointment        Daksha Henderson MD 21 yo  @ 38.6wga p/w pelvic pressure. Rh(-) and GBS +    1.  FHT reactive, BPP 8/8 DONNA 12.67cm  2.  pelvic pressure - ruled out for labor cervix is closed.  Pressure noted where fetal head is.  Pain improved after Tylenol.  UA only small leuks.   3.  VB/Labor/SROM/dec fM precautions given   4.  Pt cleared for discharge home, pt scheduled for [facility] at [facility] on     Discharged at 11:50pm        Daksha Henderson MD

## 2025-02-14 NOTE — OB PROVIDER TRIAGE NOTE - HISTORY OF PRESENT ILLNESS
21 yo  @ 38.6wga p/w pelvic pressure x 1 week.  Pt points to middle lower quadrant as area of constant pressure.  Pt reports dysuria on and off x 3 days and lower back pain.  Pt denies VB, LOF, or contractions.  Pt denies vaginal discharge and reports + FM. Pt denies complications with this pregnancy.  Rh (-) s/p Rhogam 12/.    GBS +    PNC: [facility]    USN: cephalic, BPP 8/8 DONNA 12.67cm  VE: 0/0/-3    GYN hx: Denies  PMH: denies   19 yo  @ 38.6wga p/w pelvic pressure x 1 week.  Pt points to middle lower quadrant as area of constant pressure.  Pt reports dysuria on and off x 3 days and lower back pain.  Pt denies VB, LOF, or contractions.  Pt denies vaginal discharge and reports + FM. Pt denies complications with this pregnancy.  Rh (-) s/p Rhogam 12/.    GBS +    PNC: [facility]    USN: cephalic, BPP 8/8 DONNA 12.67cm  SSE: small amount of yeast, no pooling, cervix appears closed  VE: 0/0/-3    GYN hx: Denies  PMH: denies   19 yo  @ 38.6wga p/w pelvic pressure x 1 week.  Pt points to middle lower quadrant as area of constant pressure. Pt reports occasionally she has a slight discomfort when she voids and lower back pain.  Pt denies VB, LOF, or contractions.  Pt denies vaginal discharge and reports + FM. Pt denies complications with this pregnancy.  Rh (-) s/p Rhogam .    GBS +    PNC: [facility]    USN: cephalic, BPP 8/8 DONNA 12.67cm, pain is where fetal head is  SSE: small amount of white discharge, no pooling, cervix appears closed  VE: 0/0/-3    GYN hx: Denies  PMH: denies    [id number]    19 yo  @ 38.6wga p/w pelvic pressure x 1 week.  Pt points to middle lower quadrant as area of constant pressure. Pt reports occasionally she has a slight discomfort when she voids and lower back pain.  Pt denies VB, LOF, or contractions.  Pt denies vaginal discharge and reports + FM. Pt denies complications with this pregnancy.  Rh (-) s/p Rhogam .    GBS +    PNC: [facility]    USN: cephalic, BPP 8/8 DONNA 12.67cm, pain is where fetal head is  SSE: small amount of white discharge, no pooling, cervix appears closed  VE: 0/0/-3    GYN hx: Denies  PMH: denies  PSH: denies  Meds: PNV  NKDA  Social: denies tob/etoh/drug use

## 2025-02-15 ENCOUNTER — TRANSCRIPTION ENCOUNTER (OUTPATIENT)
Age: 20
End: 2025-02-15

## 2025-02-15 LAB
BLD GP AB SCN SERPL QL: NEGATIVE — SIGNIFICANT CHANGE UP
RH IG SCN BLD-IMP: NEGATIVE — SIGNIFICANT CHANGE UP

## 2025-02-16 LAB
CULTURE RESULTS: ABNORMAL
SPECIMEN SOURCE: SIGNIFICANT CHANGE UP

## 2025-02-17 DIAGNOSIS — Z67.91 UNSPECIFIED BLOOD TYPE, RH NEGATIVE: ICD-10-CM

## 2025-02-17 DIAGNOSIS — R10.2 PELVIC AND PERINEAL PAIN: ICD-10-CM

## 2025-02-17 DIAGNOSIS — O99.891 OTHER SPECIFIED DISEASES AND CONDITIONS COMPLICATING PREGNANCY: ICD-10-CM

## 2025-02-17 DIAGNOSIS — M54.50 LOW BACK PAIN, UNSPECIFIED: ICD-10-CM

## 2025-02-17 DIAGNOSIS — Z3A.38 38 WEEKS GESTATION OF PREGNANCY: ICD-10-CM

## 2025-02-17 DIAGNOSIS — O26.893 OTHER SPECIFIED PREGNANCY RELATED CONDITIONS, THIRD TRIMESTER: ICD-10-CM

## 2025-02-20 ENCOUNTER — OUTPATIENT (OUTPATIENT)
Dept: OUTPATIENT SERVICES | Facility: HOSPITAL | Age: 20
LOS: 1 days | End: 2025-02-20
Payer: MEDICAID

## 2025-02-20 VITALS
RESPIRATION RATE: 16 BRPM | HEART RATE: 93 BPM | SYSTOLIC BLOOD PRESSURE: 111 MMHG | OXYGEN SATURATION: 99 % | DIASTOLIC BLOOD PRESSURE: 72 MMHG | TEMPERATURE: 99 F

## 2025-02-20 DIAGNOSIS — O26.899 OTHER SPECIFIED PREGNANCY RELATED CONDITIONS, UNSPECIFIED TRIMESTER: ICD-10-CM

## 2025-02-20 PROBLEM — Z78.9 OTHER SPECIFIED HEALTH STATUS: Chronic | Status: ACTIVE | Noted: 2025-02-14

## 2025-02-20 LAB — AMNISURE ROM (RUPTURE OF MEMBRANES): NEGATIVE — SIGNIFICANT CHANGE UP

## 2025-02-20 PROCEDURE — G0463: CPT

## 2025-02-20 PROCEDURE — 99212 OFFICE O/P EST SF 10 MIN: CPT

## 2025-02-20 PROCEDURE — 84112 EVAL AMNIOTIC FLUID PROTEIN: CPT

## 2025-02-20 PROCEDURE — 59025 FETAL NON-STRESS TEST: CPT

## 2025-02-20 NOTE — OB PROVIDER TRIAGE NOTE - NSHPPHYSICALEXAM_GEN_ALL_CORE
Vital Signs Last 24 Hrs  T(C): 37 (20 Feb 2025 19:41), Max: 37.2 (20 Feb 2025 19:14)  T(F): 98.6 (20 Feb 2025 19:41), Max: 99 (20 Feb 2025 19:14)  HR: 93 (20 Feb 2025 19:41) (90 - 93)  BP: 111/72 (20 Feb 2025 19:41) (111/72 - 115/76)  RR: 16 (20 Feb 2025 19:41) (16 - 17)  SpO2: 99% (20 Feb 2025 19:41) (98% - 99%)    Parameters below as of 20 Feb 2025 19:41  Patient On (Oxygen Delivery Method): room air    Physical Exam:  General: A & O x 3, in NAD  Abdomen: Gravid uterus, consistent with GA, soft, nontender  Extremities: Nontender, no swelling or discoloration  SSE: No pooling of clear fluid, small amount of white/clear discharge, small white growths noted on vaginal walls. No blood noted  SVE: 1/60/-2      FHT:  Cat 1 tracing  Baseline 130  Moderate variability  + accels, no decels    [place]: q 5-6 minutes

## 2025-02-20 NOTE — OB PROVIDER TRIAGE NOTE - PLAN OF CARE
- NST Cat 1. contractions q 6 minutes  - Amnisure negative  - Maternal and fetal status reassuring  - Pt stable for discharge  - Strict labor precautions given  - Follow up with OBGYN as scheduled  - Discussed with Dr. Russo

## 2025-02-20 NOTE — OB PROVIDER TRIAGE NOTE - ADDITIONAL INSTRUCTIONS
- Continue taking prenatal vitamins   - If your water breaks, you develop worsening contractions, or note vaginal bleeding  return to delivery room.  - Check the baby's movements daily with fetal kick count  - Strict labor precautions given  - Follow up with OBGYN as scheduled  - Discussed with Dr. Russo - Continue taking prenatal vitamins   - If your water breaks, you develop worsening contractions, or note vaginal bleeding  return to delivery room.  - Check the baby's movements daily with fetal kick count  - Strict labor precautions given  - Follow up with OBGYN in 1-2 days  - Discussed with Dr. Russo

## 2025-02-20 NOTE — OB PROVIDER TRIAGE NOTE - NS_DISCHARGEPRINT_OBGYN_ALL_OB
General OB Triage Discharge Instructions General OB Triage Discharge Instructions/[place] General OB Triage Discharge Instructions

## 2025-02-20 NOTE — OB PROVIDER TRIAGE NOTE - HISTORY OF PRESENT ILLNESS
21 y/o  at 39 weeks 5 days (LMP: / , KIKA: 2025) presents to triage with complaint of lower abd pain on and off for the past 3 days. Patient states pain comes every few hours and is about 4/10. She is also c/o leaking watery fluid since yesterday. Denies gush of fluid.   Patient has + FM, denies Vaginal bleeding  Denies headache, vision changes, chest pain, SOB, fever, chills.     PNC: [facility], uncomplicated. GBS +  OBHx: Primigravid  GYNHx: denies h/o fibroids, cysts, STDs  PMHx: denies  PSHx: denies  SocialHx: denies alcohol, drugs and smoking  Allergies: denies  Medications: PNV

## 2025-02-20 NOTE — OB PROVIDER TRIAGE NOTE - NSOBPROVIDERNOTE_OBGYN_ALL_OB_FT
21 y/o  at 39 weeks 5 days (LMP: / , KIKA: 2025) presents to triage with complaint of lower abd pain on and off for the past 3 days and watery discharge starting yesterday. NST Cat 1. VSS    - Close maternal and fetal monitoring  - Amnisure sent   - NST reviewed with Dr. Russo  - Discussed with Dr. Russo 21 y/o  at 39 weeks 5 days (LMP: / , KIKA: 2025) presents to triage with complaint of lower abd pain on and off for the past 3 days and watery discharge starting yesterday. NST Cat 1. VSS    - Close maternal and fetal monitoring  - Amnisure sent   - NST reviewed with Dr. Russo  - Discussed with Dr. Russo    Addendum 8:30 PM:  - NST Cat 1. contractions q 6 minutes  - Amnisure negative  - Maternal and fetal status reassuring  - Pt stable for discharge  - Strict labor precautions given  - Follow up with OBGYN as scheduled  - Discussed with Dr. Russo 19 y/o  at 39 weeks 5 days (LMP: / , KIKA: 2025) presents to triage with complaint of lower abd pain on and off for the past 3 days and watery discharge starting yesterday. NST Cat 1. VSS    - Close maternal and fetal monitoring  - Amnisure sent   - NST reviewed with Dr. Russo  - Discussed with Dr. Russo    Addendum 8:30 PM:  - NST Cat 1. contractions q 6 minutes  - Amnisure negative  - Maternal and fetal status reassuring  - Pt stable for discharge  - Strict labor precautions given  - Follow up with OBGYN in 1-2 days  - Discussed with Dr. Russo

## 2025-02-20 NOTE — OB RN TRIAGE NOTE - CHIEF COMPLAINT QUOTE
"I've been having lower abd and back pain on and off for 3 days. I also been having sticky/watery discharge." My mucus pull cam out 3 days ago."

## 2025-02-21 DIAGNOSIS — Z03.71 ENCOUNTER FOR SUSPECTED PROBLEM WITH AMNIOTIC CAVITY AND MEMBRANE RULED OUT: ICD-10-CM

## 2025-02-21 DIAGNOSIS — Z3A.39 39 WEEKS GESTATION OF PREGNANCY: ICD-10-CM
